# Patient Record
Sex: FEMALE | Race: ASIAN
[De-identification: names, ages, dates, MRNs, and addresses within clinical notes are randomized per-mention and may not be internally consistent; named-entity substitution may affect disease eponyms.]

---

## 2020-01-03 ENCOUNTER — HOSPITAL ENCOUNTER (INPATIENT)
Dept: HOSPITAL 15 - ER | Age: 35
LOS: 1 days | Discharge: HOME | DRG: 917 | End: 2020-01-04
Attending: INTERNAL MEDICINE | Admitting: INTERNAL MEDICINE
Payer: COMMERCIAL

## 2020-01-03 VITALS — SYSTOLIC BLOOD PRESSURE: 106 MMHG | DIASTOLIC BLOOD PRESSURE: 62 MMHG

## 2020-01-03 VITALS — SYSTOLIC BLOOD PRESSURE: 103 MMHG | DIASTOLIC BLOOD PRESSURE: 60 MMHG

## 2020-01-03 VITALS — WEIGHT: 129.63 LBS | HEIGHT: 68 IN | BODY MASS INDEX: 19.65 KG/M2

## 2020-01-03 DIAGNOSIS — F17.210: ICD-10-CM
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
## 2020-01-03 DIAGNOSIS — T43.591A: ICD-10-CM

## 2020-01-03 DIAGNOSIS — F10.10: ICD-10-CM

## 2020-01-03 DIAGNOSIS — J45.909: ICD-10-CM

## 2020-01-03 DIAGNOSIS — R94.31: ICD-10-CM

## 2020-01-03 DIAGNOSIS — G92: ICD-10-CM

## 2020-01-03 DIAGNOSIS — F14.10: ICD-10-CM
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
## 2020-01-03 DIAGNOSIS — F32.9: ICD-10-CM

## 2020-01-03 DIAGNOSIS — Z79.899: ICD-10-CM

## 2020-01-03 DIAGNOSIS — T43.021A: Primary | ICD-10-CM

## 2020-01-03 DIAGNOSIS — F41.9: ICD-10-CM

## 2020-01-03 LAB
ALBUMIN SERPL-MCNC: 3.6 G/DL (ref 3.4–5)
ALCOHOL, URINE: < 3 MG/DL (ref 0–5)
ALP SERPL-CCNC: 56 U/L (ref 45–117)
ALT SERPL-CCNC: 19 U/L (ref 13–56)
AMPHETAMINES UR QL SCN: NEGATIVE
ANION GAP SERPL CALCULATED.3IONS-SCNC: 6 MMOL/L (ref 5–15)
APAP SERPL-MCNC: < 2 UG/ML (ref 10–30)
BARBITURATES UR QL SCN: NEGATIVE
BENZODIAZ UR QL SCN: NEGATIVE
BILIRUB SERPL-MCNC: 0.5 MG/DL (ref 0.2–1)
BUN SERPL-MCNC: 11 MG/DL (ref 7–18)
BUN/CREAT SERPL: 15.1
BZE UR QL SCN: POSITIVE
CALCIUM SERPL-MCNC: 9.2 MG/DL (ref 8.5–10.1)
CANNABINOIDS UR QL SCN: NEGATIVE
CHLORIDE SERPL-SCNC: 106 MMOL/L (ref 98–107)
CO2 SERPL-SCNC: 27 MMOL/L (ref 21–32)
GLUCOSE SERPL-MCNC: 95 MG/DL (ref 74–106)
HCT VFR BLD AUTO: 38.5 % (ref 36–46)
HGB BLD-MCNC: 13.3 G/DL (ref 12.2–16.2)
MAGNESIUM SERPL-MCNC: 2.1 MG/DL (ref 1.6–2.6)
MCH RBC QN AUTO: 32 PG (ref 28–32)
MCV RBC AUTO: 92.4 FL (ref 80–100)
NRBC BLD QL AUTO: 0 %
OPIATES UR QL SCN: NEGATIVE
PCP UR QL SCN: NEGATIVE
POTASSIUM SERPL-SCNC: 3.5 MMOL/L (ref 3.5–5.1)
PROT SERPL-MCNC: 6.9 G/DL (ref 6.4–8.2)
SALICYLATES SERPL-MCNC: < 1.7 MG/DL (ref 2.8–20)
SODIUM SERPL-SCNC: 139 MMOL/L (ref 136–145)

## 2020-01-03 PROCEDURE — 71045 X-RAY EXAM CHEST 1 VIEW: CPT

## 2020-01-03 PROCEDURE — 96374 THER/PROPH/DIAG INJ IV PUSH: CPT

## 2020-01-03 PROCEDURE — 36415 COLL VENOUS BLD VENIPUNCTURE: CPT

## 2020-01-03 PROCEDURE — 80320 DRUG SCREEN QUANTALCOHOLS: CPT

## 2020-01-03 PROCEDURE — 80329 ANALGESICS NON-OPIOID 1 OR 2: CPT

## 2020-01-03 PROCEDURE — 81001 URINALYSIS AUTO W/SCOPE: CPT

## 2020-01-03 PROCEDURE — 81025 URINE PREGNANCY TEST: CPT

## 2020-01-03 PROCEDURE — 80053 COMPREHEN METABOLIC PANEL: CPT

## 2020-01-03 PROCEDURE — 85025 COMPLETE CBC W/AUTO DIFF WBC: CPT

## 2020-01-03 PROCEDURE — 96361 HYDRATE IV INFUSION ADD-ON: CPT

## 2020-01-03 PROCEDURE — 80307 DRUG TEST PRSMV CHEM ANLYZR: CPT

## 2020-01-03 PROCEDURE — 83735 ASSAY OF MAGNESIUM: CPT

## 2020-01-03 PROCEDURE — 99291 CRITICAL CARE FIRST HOUR: CPT

## 2020-01-03 PROCEDURE — 93005 ELECTROCARDIOGRAM TRACING: CPT

## 2020-01-03 PROCEDURE — 70450 CT HEAD/BRAIN W/O DYE: CPT

## 2020-01-03 RX ADMIN — SODIUM CHLORIDE SCH MLS/HR: 0.9 INJECTION, SOLUTION INTRAVENOUS at 16:46

## 2020-01-03 NOTE — NUR
RECEIVED CALL FROM DIAN FROM POISON CONTROL AT THIS TIME FOR STATUS UPDATE ON PATIENT. 
DIAN STATES HE WILL CALL AGAIN TOMORROW.

## 2020-01-03 NOTE — NUR
Opening Shift Note



Assumed care of patient, awake and alert.  No S/S of distress/SOB or pain. Respirations even 
and unlabored. Patient is on room air. Cardenas hanging below level of bladder, is patent, and 
draining clear, yellow urine. Instructed on POC and to call for assist PRN, will continue to 
monitor for changes Q1hr and PRN.

## 2020-01-03 NOTE — NUR
Closing Shift Note

Patient resting in bed. No distress noted. Will endorse care to the night shift RN.

## 2020-01-03 NOTE — NUR
Telemetry admit from ER

LAUREN CHARLES admitted to Telemetry unit after SBAR received.  Patient oriented to RUTHANN MAXWELL RN primary RN, unit, room, bed, and unit policies regarding patient care and 
visiting hours. Patient now on continuous telemetry monitoring, tele box #26  and telemetry 
reading on arrival to unit is sinus rhythm. Patient placed on bedside oxygen, weighed by 
bedscale and encouraged to call if they need something. All questions and concerns 
addressed, patient verbalized understanding. Sitter at bedside for safety.

## 2020-01-04 VITALS — DIASTOLIC BLOOD PRESSURE: 58 MMHG | SYSTOLIC BLOOD PRESSURE: 92 MMHG

## 2020-01-04 VITALS — DIASTOLIC BLOOD PRESSURE: 52 MMHG | SYSTOLIC BLOOD PRESSURE: 93 MMHG

## 2020-01-04 VITALS — DIASTOLIC BLOOD PRESSURE: 59 MMHG | SYSTOLIC BLOOD PRESSURE: 95 MMHG

## 2020-01-04 VITALS — SYSTOLIC BLOOD PRESSURE: 103 MMHG | DIASTOLIC BLOOD PRESSURE: 62 MMHG

## 2020-01-04 VITALS — DIASTOLIC BLOOD PRESSURE: 56 MMHG | SYSTOLIC BLOOD PRESSURE: 93 MMHG

## 2020-01-04 RX ADMIN — SODIUM CHLORIDE SCH MLS/HR: 0.9 INJECTION, SOLUTION INTRAVENOUS at 02:03

## 2020-01-04 NOTE — NUR
Discharge instructions given as ordered. Encourage to follow up with PMD as instructed. All 
questions and concerns addressed. Patient verbalized understanding. Medication 
reconciliation form completed and copy given to patient.  IV removed with catheter intact, 
pressure dressing applied. Telemetry unit returned to ICU. Patient taken to vehicle via 
wheelchair with all personal belongings, accompanied by staff and family member. No distress 
noted at time of departure.

## 2020-01-04 NOTE — NUR
IV REMOVAL DUE TO PATIENT COMPLAINT OF SORENESS IN LEFT WRIST AND PRESENCE OF BLEEDING 



IV DC'd with clean sterile technique, catheter fully intact. Pressure dressing applied to 
site. Heating pack applied to site. Patient tolerated well.

## 2020-01-04 NOTE — NUR
ROUNDS

Dr Keller at bedside for rounds. New orders received and followed through. Patient 
educated in length on the risks and benefits of recreation drug use and abuse and the 
importance of stopping use, verbalized understanding. Patient verbalized she has no 
intention at this time or any previous time of harming herself or others. Patient verbalized 
she has a safe living environment with her  and will return to this environment.

## 2020-01-04 NOTE — NUR
CLOSING NOTE 



Patient has no S/S of distress/SOB or pain. Respirations even and unlabored. Patient is on 
room air. Cardenas hanging below level of bladder, is patent, and draining clear, yellow urine.

## 2020-01-04 NOTE — NUR
Opening Shift Note

Assumed care of patient, awake, alert but drowsy and oriented X4. No S/S of distress/SOB or 
pain. Tele# 26, sinus rhythm @ 64 bpm. IV to right antecubital, 20 gauge, patent and 
infusing 0.9% NS @ 100 ml/hr. Urethral Cardenas catheter draining clear, straw urine to 
gravity. Instructed on POC and to call for assist PRN, verbalized understanding. Bed locked, 
in lowest position, call light within reach,  remains at bedside for patient 
safety, will continue to monitor for changes Q1hr and PRN.

## 2020-01-04 NOTE — NUR
POISON CONTROL

Call received from Domenico with poison control. All requested information provided. Per 
Domenico, they will sign off on patient at this time.